# Patient Record
Sex: MALE
[De-identification: names, ages, dates, MRNs, and addresses within clinical notes are randomized per-mention and may not be internally consistent; named-entity substitution may affect disease eponyms.]

---

## 2020-03-05 PROBLEM — Z00.00 ENCOUNTER FOR PREVENTIVE HEALTH EXAMINATION: Status: ACTIVE | Noted: 2020-03-05

## 2020-03-25 ENCOUNTER — APPOINTMENT (OUTPATIENT)
Dept: TRANSPLANT | Facility: CLINIC | Age: 30
End: 2020-03-25

## 2020-03-25 ENCOUNTER — APPOINTMENT (OUTPATIENT)
Dept: NEPHROLOGY | Facility: CLINIC | Age: 30
End: 2020-03-25

## 2020-03-25 ENCOUNTER — APPOINTMENT (OUTPATIENT)
Dept: RADIOLOGY | Facility: IMAGING CENTER | Age: 30
End: 2020-03-25

## 2020-03-25 ENCOUNTER — APPOINTMENT (OUTPATIENT)
Dept: CT IMAGING | Facility: IMAGING CENTER | Age: 30
End: 2020-03-25

## 2020-05-05 DIAGNOSIS — Z00.5 ENCOUNTER FOR EXAMINATION OF POTENTIAL DONOR OF ORGAN AND TISSUE: ICD-10-CM

## 2020-05-06 ENCOUNTER — APPOINTMENT (OUTPATIENT)
Dept: NEPHROLOGY | Facility: CLINIC | Age: 30
End: 2020-05-06
Payer: SUBSIDIZED

## 2020-05-06 ENCOUNTER — APPOINTMENT (OUTPATIENT)
Dept: TRANSPLANT | Facility: CLINIC | Age: 30
End: 2020-05-06
Payer: SUBSIDIZED

## 2020-05-06 ENCOUNTER — TRANSCRIPTION ENCOUNTER (OUTPATIENT)
Age: 30
End: 2020-05-06

## 2020-05-06 PROCEDURE — 99203 OFFICE O/P NEW LOW 30 MIN: CPT | Mod: 95

## 2020-05-06 RX ORDER — OMEPRAZOLE 20 MG/1
20 CAPSULE, DELAYED RELEASE ORAL
Qty: 90 | Refills: 3 | Status: ACTIVE | COMMUNITY
Start: 2020-05-06

## 2020-05-06 NOTE — ASSESSMENT
[Excellent candidate] : an excellent candidate. We should proceed with our protocol for evaluation for kidney transplantation. [FreeTextEntry1] : He seems to be an excellent candidate for donation pending full workup\par He will have an appointment later for full physical exam

## 2020-05-06 NOTE — HISTORY OF PRESENT ILLNESS
[Donor denies coercion and/or being paid to donate kidney] : Donor denies coercion and/or being paid to donate kidney [FreeTextEntry3] : 5/6/20 [FreeTextEntry4] : no relationship [FreeTextEntry2] : to help someone.  Mother donated.  [FreeTextEntry5] : Mr. Vallejo is a 29 y.o male who would like to donate a kidney via [facility].  Pt denies any medical problems.  No hospitalizations.  Occasional allergies.  He has felt well and had no recent infections.  Lives in [place].  His mother donated a kidney in [facility] but did not have a great experience - therefore he would like to donate here in [place].  \par Takes omeprazole 20mgs daily.  Had an endoscopy 3 years ago.  Can skip on occasion.  \par \par Social history:  no smoking, rare alcohol, no drug use.  Pt is  and has 1.5 year old child.  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  Family is supportive.\par Family history:  Parents are healthy.  Grandparents alive.  Grandfather received a kidney transplant.  No diabetes or HTN.  Has an older sister and 2 younger siblings - healthy.  \par Allergies :  NKDA.  \par Surgical history:  No surgeries.

## 2020-05-06 NOTE — PLAN
[FreeTextEntry1] : Excellent candidate for donation pending full workup\par will have an appointment in  the future for full physical exam and completion of workup\par then will discuss in multidisciplinary meeting

## 2020-05-06 NOTE — HISTORY OF PRESENT ILLNESS
[Home] : at home, [unfilled] , at the time of the visit. [Other Location: e.g. Home (Enter Location, City,State)___] : at [unfilled] [Other:____] : [unfilled] [Patient] : the patient [FreeTextEntry4] : Tawanda Roland NP transplant coordinator [Donor denies coercion and/or being paid to donate kidney] : Donor denies coercion and/or being paid to donate kidney [FreeTextEntry5] : Patient is a 29 years old altruistic donor. he is donating via [facility]\par He is healthy; no diabetes, hypertension , hematuria, kidney stones or recurrent UTIs\par The only medical problem is acid reflux; he had upper endoscopy 3 years ago and he is on omeprazole almost daily\par no hospital admissions\par no previous surgery\par \par no allergies\par \par Medications: omeprazole\par \par Social history:  and has a one and half year old daughter. he is a PhD student  in education. he lives in [place] \par he is a non smoker and no alcohol use\par \par Family history: both parents are alive and healthy; his mother was a kidney donor and doing well. his grandfather received a kidney transplant from one of his sons (patients uncle)\par

## 2020-05-06 NOTE — HISTORY OF PRESENT ILLNESS
[Home] : at home, [unfilled] , at the time of the visit. [Other Location: e.g. Home (Enter Location, City,State)___] : at [unfilled] [Other:____] : [unfilled] [Patient] : the patient [FreeTextEntry4] : Tawanda Roland NP transplant coordinator [Donor denies coercion and/or being paid to donate kidney] : Donor denies coercion and/or being paid to donate kidney [FreeTextEntry5] : Patient is a 29 years old altruistic donor. he is donating via [facility]\par He is healthy; no diabetes, hypertension , hematuria, kidney stones or recurrent UTIs\par The only medical problem is acid reflux; he had upper endoscopy 3 years ago and he is on omeprazole almost daily\par no hospital admissions\par no previous surgery\par \par no allergies\par \par Medications: omeprazole\par \par Social history:  and has a one and half year old daughter. 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 his grandfather received a kidney transplant from one of his sons (patients uncle)\par

## 2020-05-06 NOTE — PLAN
[FreeTextEntry1] : Mr. Vallejo is a reasonable candidate for kidney donation pending completion of his donor evaluation.\par He is healthy, thin and does not have significant problems in his immediate family.\par We discussed risks of kidney donation and in addition discussed risk of nosocomial COVID19 infection.  We spoke about pre donation pcr testing and need for quarantine 1 week prior to donation.\par All patients questions were answered.

## 2020-05-06 NOTE — PHYSICAL EXAM
[General Appearance - Alert] : alert [General Appearance - In No Acute Distress] : in no acute distress [General Appearance - Well Nourished] : well nourished [General Appearance - Well Developed] : well developed [Sclera] : the sclera and conjunctiva were normal [Extraocular Movements] : extraocular movements were intact [Outer Ear] : the ears and nose were normal in appearance [Hearing Threshold Finger Rub Not Allegan] : hearing was normal [Examination Of The Oral Cavity] : the lips and gums were normal [Neck Appearance] : the appearance of the neck was normal [] : no respiratory distress [Exaggerated Use Of Accessory Muscles For Inspiration] : no accessory muscle use [Oriented To Time, Place, And Person] : oriented to person, place, and time [Impaired Insight] : insight and judgment were intact [Affect] : the affect was normal [Mood] : the mood was normal

## 2020-06-18 PROBLEM — Z00.5 WILLING TO BE KIDNEY DONOR: Status: ACTIVE | Noted: 2020-03-06

## 2020-06-24 ENCOUNTER — APPOINTMENT (OUTPATIENT)
Dept: RADIOLOGY | Facility: IMAGING CENTER | Age: 30
End: 2020-06-24

## 2020-06-24 ENCOUNTER — APPOINTMENT (OUTPATIENT)
Dept: CT IMAGING | Facility: IMAGING CENTER | Age: 30
End: 2020-06-24

## 2020-06-24 ENCOUNTER — APPOINTMENT (OUTPATIENT)
Dept: TRANSPLANT | Facility: CLINIC | Age: 30
End: 2020-06-24